# Patient Record
Sex: MALE | Race: WHITE | Employment: STUDENT | URBAN - METROPOLITAN AREA
[De-identification: names, ages, dates, MRNs, and addresses within clinical notes are randomized per-mention and may not be internally consistent; named-entity substitution may affect disease eponyms.]

---
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
## 2020-08-28 ENCOUNTER — TELEPHONE (OUTPATIENT)
Dept: FAMILY MEDICINE | Facility: CLINIC | Age: 20
End: 2020-08-28

## 2020-08-28 NOTE — TELEPHONE ENCOUNTER
Reason for call:  Symptom   Symptom or request: sore throat    Duration (how long have symptoms been present): 4 days  Have you been treated for this before? No    Additional comments: [facility] and strep test was negative. He states his throat hurts so bad it's hard for him to talk. Please call to advise. Thank you.    Phone number to reach patient:  Cell number on file:    Telephone Information:   Mobile [phone]       Best Time:  soon    Can we leave a detailed message on this number?  NO    Travel screening: Not Applicable

## 2020-08-28 NOTE — TELEPHONE ENCOUNTER
Patient reports that that his throat is still very sore and it really hurts to swallow.  His is able to swallow, but causes him pain.  He just took some ibuprofen and that help a little.  He was told by urgent care to gargle with salt water, eat popsicles and take ibuprofen. He has been doing these things and it still hurts.    Nursing advice:  Patient to be reevaluated in clinic.  I tried to assist the patient in making an appointment and there are none in this area.  He was advised to utilize urgent care and hours/locations given.  He stated that he called urgent care and they told him that he should be retested tomorrow for strep and asked what he should do.  I informed him he can do whatever he chooses, but my advice is if his throat is that painful that he should be reassessed in clinic today.  Patient stated he will be some in for retesting tomorrow.  He was advised to continue the above recommendations from the previous urgent care.   Patient was given signs and symptoms to be seen sooner, go to the E.R. or call 911.  Patient verbalizes good understanding, agrees with plan and states she needs no further support. Minerva Carlin R.N.

## 2022-01-31 ENCOUNTER — OFFICE VISIT (OUTPATIENT)
Dept: URGENT CARE | Facility: URGENT CARE | Age: 22
End: 2022-01-31
Payer: COMMERCIAL

## 2022-01-31 ENCOUNTER — ANCILLARY PROCEDURE (OUTPATIENT)
Dept: GENERAL RADIOLOGY | Facility: CLINIC | Age: 22
End: 2022-01-31
Attending: NURSE PRACTITIONER
Payer: COMMERCIAL

## 2022-01-31 VITALS
SYSTOLIC BLOOD PRESSURE: 139 MMHG | WEIGHT: 315 LBS | HEART RATE: 81 BPM | BODY MASS INDEX: 42.72 KG/M2 | OXYGEN SATURATION: 98 % | TEMPERATURE: 99.4 F | DIASTOLIC BLOOD PRESSURE: 91 MMHG

## 2022-01-31 DIAGNOSIS — S69.92XA THUMB INJURY, LEFT, INITIAL ENCOUNTER: ICD-10-CM

## 2022-01-31 DIAGNOSIS — S69.92XA THUMB INJURY, LEFT, INITIAL ENCOUNTER: Primary | ICD-10-CM

## 2022-01-31 PROBLEM — E66.01 MORBID OBESITY (H): Status: ACTIVE | Noted: 2022-01-31

## 2022-01-31 PROCEDURE — 73140 X-RAY EXAM OF FINGER(S): CPT | Mod: LT | Performed by: RADIOLOGY

## 2022-01-31 PROCEDURE — 99213 OFFICE O/P EST LOW 20 MIN: CPT | Performed by: NURSE PRACTITIONER
